# Patient Record
Sex: FEMALE | Race: OTHER | NOT HISPANIC OR LATINO | Employment: OTHER | URBAN - METROPOLITAN AREA
[De-identification: names, ages, dates, MRNs, and addresses within clinical notes are randomized per-mention and may not be internally consistent; named-entity substitution may affect disease eponyms.]

---

## 2022-01-24 ENCOUNTER — TELEPHONE (OUTPATIENT)
Dept: OBSTETRICS AND GYNECOLOGY | Facility: CLINIC | Age: 82
End: 2022-01-24
Payer: MEDICARE

## 2022-01-24 NOTE — TELEPHONE ENCOUNTER
Fax successfully sent to [facility]. LVM stating successful fax and to call their scheduling line ([phone]) to get the appointment set up.       ----- Message from Angela Malagon sent at 1/21/2022  4:05 PM CST -----  Type: Needs Medical Advice  Who Called:  pt  Symptoms (please be specific):    How long has patient had these symptoms:    Pharmacy name and phone #:    Best Call Back Number: [phone] (home)     Additional Information: pt states she is requesting her bone density order to be sent to [facility]. Pt states she received a call last week that it was sent but they never received it. Please call to discuss.

## 2022-03-17 ENCOUNTER — OFFICE VISIT (OUTPATIENT)
Dept: OBSTETRICS AND GYNECOLOGY | Facility: CLINIC | Age: 82
End: 2022-03-17
Payer: MEDICARE

## 2022-03-17 ENCOUNTER — HOSPITAL ENCOUNTER (OUTPATIENT)
Dept: RADIOLOGY | Facility: CLINIC | Age: 82
Discharge: HOME | End: 2022-03-17
Attending: NURSE PRACTITIONER
Payer: MEDICARE

## 2022-03-17 VITALS
BODY MASS INDEX: 32.33 KG/M2 | WEIGHT: 189.38 LBS | DIASTOLIC BLOOD PRESSURE: 79 MMHG | SYSTOLIC BLOOD PRESSURE: 100 MMHG | HEIGHT: 64 IN | HEIGHT: 64 IN | BODY MASS INDEX: 32.27 KG/M2 | WEIGHT: 189 LBS

## 2022-03-17 DIAGNOSIS — Z12.31 ENCOUNTER FOR SCREENING MAMMOGRAM FOR MALIGNANT NEOPLASM OF BREAST: ICD-10-CM

## 2022-03-17 DIAGNOSIS — Z00.01 ANNUAL VISIT FOR GENERAL ADULT MEDICAL EXAMINATION WITH ABNORMAL FINDINGS: Primary | ICD-10-CM

## 2022-03-17 DIAGNOSIS — N90.89 VULVAR LESION: ICD-10-CM

## 2022-03-17 PROCEDURE — G0101 CA SCREEN;PELVIC/BREAST EXAM: HCPCS | Mod: S$GLB,,, | Performed by: OBSTETRICS & GYNECOLOGY

## 2022-03-17 PROCEDURE — 77067 MAMMO DIGITAL SCREENING BILAT WITH TOMO: ICD-10-PCS | Mod: S$GLB,,, | Performed by: RADIOLOGY

## 2022-03-17 PROCEDURE — G0101 PR CA SCREEN;PELVIC/BREAST EXAM: ICD-10-PCS | Mod: S$GLB,,, | Performed by: OBSTETRICS & GYNECOLOGY

## 2022-03-17 PROCEDURE — 77063 MAMMO DIGITAL SCREENING BILAT WITH TOMO: ICD-10-PCS | Mod: S$GLB,,, | Performed by: RADIOLOGY

## 2022-03-17 PROCEDURE — 77063 BREAST TOMOSYNTHESIS BI: CPT | Mod: S$GLB,,, | Performed by: RADIOLOGY

## 2022-03-17 PROCEDURE — 77067 SCR MAMMO BI INCL CAD: CPT | Mod: S$GLB,,, | Performed by: RADIOLOGY

## 2022-03-17 RX ORDER — SILVER SULFADIAZINE 10 G/1000G
CREAM TOPICAL
Qty: 25 G | Refills: 1 | Status: SHIPPED | OUTPATIENT
Start: 2022-03-17 | End: 2023-03-17

## 2022-03-17 RX ORDER — HYDROGEN PEROXIDE 3 %
20 SOLUTION, NON-ORAL MISCELLANEOUS
COMMUNITY

## 2022-03-17 RX ORDER — FUROSEMIDE 20 MG/1
20 TABLET ORAL
COMMUNITY
Start: 2021-09-27

## 2022-03-17 RX ORDER — MONTELUKAST SODIUM 10 MG/1
TABLET ORAL
COMMUNITY
Start: 2021-07-26

## 2022-03-17 RX ORDER — PRAZOSIN HYDROCHLORIDE 1 MG/1
1 CAPSULE ORAL
COMMUNITY
Start: 2022-02-23

## 2022-03-17 RX ORDER — KETOROLAC TROMETHAMINE 4 MG/ML
1 SOLUTION/ DROPS OPHTHALMIC
COMMUNITY

## 2022-03-17 RX ORDER — TOLTERODINE 4 MG/1
CAPSULE, EXTENDED RELEASE ORAL
COMMUNITY
Start: 2021-03-02

## 2022-03-17 RX ORDER — ESZOPICLONE 1 MG/1
1 TABLET, FILM COATED ORAL
COMMUNITY
Start: 2021-12-15

## 2022-03-17 RX ORDER — ROSUVASTATIN CALCIUM 10 MG/1
TABLET, COATED ORAL
COMMUNITY
Start: 2021-07-26

## 2022-03-17 RX ORDER — IBANDRONATE SODIUM 150 MG/1
150 TABLET, FILM COATED ORAL
COMMUNITY

## 2022-03-17 RX ORDER — FAMOTIDINE 20 MG/1
20 TABLET, FILM COATED ORAL
COMMUNITY

## 2022-03-17 RX ORDER — LISINOPRIL 20 MG/1
TABLET ORAL
COMMUNITY
Start: 2021-07-26

## 2022-03-17 RX ORDER — LEVOCETIRIZINE DIHYDROCHLORIDE 5 MG/1
5 TABLET, FILM COATED ORAL
COMMUNITY
Start: 2021-12-27

## 2022-03-17 RX ORDER — ERYTHROMYCIN 5 MG/G
OINTMENT OPHTHALMIC
COMMUNITY

## 2022-03-17 RX ORDER — METOPROLOL SUCCINATE 200 MG/1
100 TABLET, EXTENDED RELEASE ORAL
COMMUNITY

## 2022-03-17 NOTE — PROGRESS NOTES
CC: Well woman exam    Renae Shook is a 81 y.o. female No obstetric history on file. presents for well woman exam.  LMP: No LMP recorded. Patient is postmenopausal..  No issues, problems, or complaints. Patients last pap was  WNL.  Last wellness labs were done a year ago with PCP.  Last mammogram was a year ago. She is a non smoker . Denies any anxiety and depression. She uses a seat belt while riding in the car.  Eating well and exercising.  no sexually active.The current method of family planning is none.  She does report a small lesion on the left side of her gluteal fold towards her vulva.  She also reports significant vaginal itching of that area.  She has a history of BLAINE and is concerned.      Chief Complaint   Patient presents with    Annual Exam     Pt is here for her Annual Well Woman Visit. Pt [id number], LPAP , Last colonoscopy was in , SBE monthly, PCP Dr. Addison, last wellness labs were with PCP a year ago.        Past Medical History:   Diagnosis Date    Diabetes mellitus      Past Surgical History:   Procedure Laterality Date    back epidural      BACK SURGERY      foot        Social History     Socioeconomic History    Marital status:    Tobacco Use    Smoking status: Never Smoker    Smokeless tobacco: Never Used   Substance and Sexual Activity    Alcohol use: Never    Drug use: Never    Sexual activity: Not Currently     Partners: Male     Family History   Problem Relation Age of Onset    Pacemaker/defibrilator Mother     Diabetes Maternal Aunt      OB History             Para        Term   0            AB        Living           SAB        IAB        Ectopic        Multiple        Live Births                     ROS:  Review of Systems   Constitutional: Negative for fatigue, fever and unexpected weight change.   HENT: Negative for nasal congestion.    Respiratory: Negative for cough and shortness of breath.    Cardiovascular: Negative for chest  "pain, palpitations and leg swelling.   Gastrointestinal: Negative for abdominal pain, constipation, diarrhea, nausea, vomiting and reflux.   Endocrine: Negative for diabetes, hair loss and hot flashes.   Genitourinary: Negative for bladder incontinence, decreased libido, dysmenorrhea, dyspareunia, dysuria, hot flashes, menorrhagia, menstrual problem, pelvic pain, vaginal discharge and vaginal dryness.   Musculoskeletal: Negative for arthralgias, back pain and myalgias.   Integumentary:  Negative for rash, acne, hair changes, mole/lesion, breast mass, nipple discharge, breast skin changes and breast tenderness.   Neurological: Negative for seizures, syncope and headaches.   Hematological: Negative for adenopathy. Does not bruise/bleed easily.   Psychiatric/Behavioral: Negative for depression and sleep disturbance. The patient is not nervous/anxious.    Breast: Negative for breast self exam, lump, mass, mastodynia, nipple discharge, skin changes and tenderness       /79   Ht 5' 4" (1.626 m)   Wt 85.7 kg (189 lb)   BMI 32.44 kg/m²     PHYSICAL EXAM:  Physical Exam:   Constitutional: She is oriented to person, place, and time. She appears well-developed and well-nourished. No distress.    HENT:   Head: Normocephalic and atraumatic.   Nose: Nose normal.    Eyes: Pupils are equal, round, and reactive to light. Conjunctivae and EOM are normal.    Neck: No thyromegaly present.    Cardiovascular: Normal rate, regular rhythm and normal heart sounds.  Exam reveals no clubbing, no cyanosis and no edema.     Pulmonary/Chest: Effort normal and breath sounds normal. She has no wheezes. Right breast exhibits no inverted nipple, no mass, no nipple discharge and no bleeding. Left breast exhibits no inverted nipple, no mass, no nipple discharge and no bleeding. Breasts are symmetrical.        Abdominal: Soft. Bowel sounds are normal. She exhibits no distension and no mass. There is no abdominal tenderness. There is no rebound " and no guarding.     Genitourinary:    Inguinal canal and vagina normal.   Labial bartholins normal.There is no rash or lesion on the right labia. There is no rash or lesion on the left labia. There is an absent right adnexa and an absent left adnexa. No  no vaginal discharge, tenderness, rectocele, cystocele or unspecified prolapse of vaginal walls in the vagina. Cervix is absent.Cervix exhibits no motion tenderness and no discharge. Uterus is absent.           Musculoskeletal: Normal range of motion and moves all extremeties. No edema.       Neurological: She is alert and oriented to person, place, and time. She has normal reflexes.    Skin: Skin is warm and dry. No rash noted. No cyanosis. Nails show no clubbing.    Psychiatric: She has a normal mood and affect. Her behavior is normal. Judgment and thought content normal.        Annual visit for general adult medical examination with abnormal findings    Vulvar lesion    Other orders  -     silver sulfADIAZINE 1% (SILVADENE) 1 % cream; Apply to affected area 2 times daily  Dispense: 25 g; Refill: 1  -     hydrocortisone-pramoxine 1.85-1.15 % Crea; Place 1 application rectally 2 (two) times a day.  Dispense: 60 g; Refill: 1      Recommend close observation of vulvar lesion.  Will start Silvadene cream as it appears to be a superficial lesion however close observation to rule out vulvar skin neoplasm is warranted.  Patient expressed understanding.  Return in 1 week for follow-up.  Steroid cream for hemorrhoidal like lesions on rectum.  Patient was counseled today on A.C.S. Pap guidelines and recommendations for yearly pelvic exams, mammograms and monthly self breast exams; to see her PCP for other health maintenance.     No follow-ups on file.

## 2022-03-22 ENCOUNTER — OFFICE VISIT (OUTPATIENT)
Dept: OBSTETRICS AND GYNECOLOGY | Facility: CLINIC | Age: 82
End: 2022-03-22
Payer: MEDICARE

## 2022-03-22 VITALS
HEIGHT: 64 IN | SYSTOLIC BLOOD PRESSURE: 138 MMHG | DIASTOLIC BLOOD PRESSURE: 82 MMHG | BODY MASS INDEX: 32.13 KG/M2 | WEIGHT: 188.19 LBS

## 2022-03-22 DIAGNOSIS — N90.89 VULVAR LESION: Primary | ICD-10-CM

## 2022-03-22 PROCEDURE — 99213 PR OFFICE/OUTPT VISIT, EST, LEVL III, 20-29 MIN: ICD-10-PCS | Mod: S$GLB,,, | Performed by: OBSTETRICS & GYNECOLOGY

## 2022-03-22 PROCEDURE — 99213 OFFICE O/P EST LOW 20 MIN: CPT | Mod: S$GLB,,, | Performed by: OBSTETRICS & GYNECOLOGY

## 2022-03-22 RX ORDER — LANCETS 33 GAUGE
EACH MISCELLANEOUS
COMMUNITY
Start: 2022-02-06

## 2022-03-22 RX ORDER — FERROUS SULFATE, DRIED 160(50) MG
1 TABLET, EXTENDED RELEASE ORAL
COMMUNITY

## 2022-03-22 RX ORDER — CELECOXIB 200 MG/1
200 CAPSULE ORAL
COMMUNITY
Start: 2021-10-14

## 2022-03-22 RX ORDER — CHOLECALCIFEROL (VITAMIN D3) 25 MCG
2000 TABLET ORAL
COMMUNITY

## 2022-03-22 RX ORDER — BUSPIRONE HYDROCHLORIDE 5 MG/1
TABLET ORAL
COMMUNITY
Start: 2021-01-28

## 2022-03-22 NOTE — PROGRESS NOTES
Patient ID: Renae Shook is a 81 y.o. female.    Chief Complaint:  Follow-up (Pt is here for a follow up visit )      History of Present Illness  HPI  Patient here for follow-up from vulvar lesion.  Patient with ulcerated 8 mm lesion on the left gluteal cleft near the vulva.  No induration.  Patient states that had been there for roughly 3 weeks.  Is tender when she sits down.  She was started on Silvadene cream twice a day and states it does feel better.  She did recently noticed that 1 of her portable Chairs has a plastics eat with a crack in it that is in the area of the lesion that may be that it got pinched when she was sitting down.  Patient has a history of endometrial cancer and is in remission.    GYN & OB History  No LMP recorded. Patient is postmenopausal.   Date of Last Pap: No result found    Past Medical History:   Diagnosis Date    Diabetes mellitus      Past Surgical History:   Procedure Laterality Date    back epidural      BACK SURGERY      foot        Review of patient's allergies indicates:   Allergen Reactions    Ondansetron Nausea And Vomiting     CAUSES VOMITING      Sulfamethoxazole-trimethoprim Itching and Rash    Aspirin     Penicillins     Sulfa (sulfonamide antibiotics)      Other reaction(s): Unknown    Tetanus and diphtheria toxoids     Tetanus immune globulin     Tetanus toxoid      Current Outpatient Medications on File Prior to Visit   Medication Sig Dispense Refill    blood sugar diagnostic (CONTOUR NEXT TEST STRIPS MISC)   Contour Next test strips, See Instructions, Use to check blood glucose once daily. Dx code E11.9, # 100 EA, 3 Refill(s), Pharmacy: [id number], 163.7, cm, 01/25/22 9:08:00 CST, Height/Length Measured, 83.2, kg, 01/25/22 9:08:00 CS...      busPIRone (BUSPAR) 5 MG Tab   See Instructions, TAKE 1 TABLET AT BEDTIME AND EVERY EIGHT HOURS DURING THE DAY IF NEEDED., # 90 tab, 3 Refill(s), Pharmacy: [facility], 163.7, cm,  12/27/21 10:31:00 CST, Height/Length Measured, 86.5, kg, 12/27/21 10:31:00 CST, Weight Dosing      calcium-vitamin D3 (OS-PADMAJA 500 + D3) 500 mg-5 mcg (200 unit) per tablet Take 1 tablet by mouth.      celecoxib (CELEBREX) 200 MG capsule 200 mg.      docosahexaenoic acid-epa 120-180 mg Cap Take 1 capsule by mouth once daily.      erythromycin (ROMYCIN) ophthalmic ointment Apply to eye.      esomeprazole (NEXIUM) 20 MG capsule Take 20 mg by mouth.      eszopiclone (LUNESTA) 1 MG Tab 1 mg.      famotidine (PEPCID) 20 MG tablet Take 20 mg by mouth.      furosemide (LASIX) 20 MG tablet 20 mg.      histamine phosphate 0.1 (0.275) mg/mL Soln Histamine #3 0.1-0.5ml sub cut weekly      hydrocortisone-pramoxine 1.85-1.15 % Crea Place 1 application rectally 2 (two) times a day. 60 g 1    ibandronate (BONIVA) 150 mg tablet Take 150 mg by mouth.      ketorolac 0.4% (ACULAR) 0.4 % Drop 1 drop.      lancets 33 gauge Misc   33 g Lancets, See Instructions, Test once daily E11.9, # 100 EA, 3 Refill(s), Pharmacy: [facility], 163.7, cm, 01/25/22 9:08:00 CST, Height/Length Measured, 83.2, kg, 01/25/22 9:08:00 CST, Weight Dosing      levocetirizine (XYZAL) 5 MG tablet 5 mg.      lisinopriL (PRINIVIL,ZESTRIL) 20 MG tablet   = 1 tab, Oral, BID, # 180 tab, 3 Refill(s), Maintenance, Pharmacy: [facility]165, 163, cm, 07/26/21 9:25:00 CDT, Height/Length Measured, 86.8, kg, 07/26/21 9:25:00 CDT, Weight Dosing      metoprolol succinate (TOPROL-XL) 200 MG 24 hr tablet Take 100 mg by mouth.      montelukast (SINGULAIR) 10 mg tablet   See Instructions, TAKE 1 TABLET EACH EVENING., # 90 tab, 3 Refill(s), Soft Stop, Pharmacy: [facility], 163, cm, 07/26/21 9:25:00 CDT, Height/Length Measured, 86.8, kg, 07/26/21 9:25:00 CDT, Weight Dosing      prazosin (MINIPRESS) 1 MG Cap 1 mg.      rosuvastatin (CRESTOR) 10 MG tablet   = 1 tab, Oral, Daily, # 90 tab, 3 Refill(s), Soft Stop, Pharmacy:  [facility], 163, cm, 21 9:25:00 CDT, Height/Length Measured, 86.8, kg, 21 9:25:00 CDT, Weight Dosing      silver sulfADIAZINE 1% (SILVADENE) 1 % cream Apply to affected area 2 times daily 25 g 1    tolterodine (DETROL LA) 4 MG 24 hr capsule   = 1 cap, Oral, Daily, # 90 cap, 3 Refill(s), Maintenance, Pharmacy: [facility], 163, cm, 02/10/21 14:27:00 CST, Height/Length Measured, 82.2, kg, 02/10/21 14:27:00 CST, Weight Dosing      vitamin D (VITAMIN D3) 1000 units Tab Take 2,000 Units by mouth.       No current facility-administered medications on file prior to visit.       OB History    Para Term  AB Living       0         SAB IAB Ectopic Multiple Live Births                   Review of Systems  Review of Systems   Constitutional: Negative for fatigue, fever and unexpected weight change.   HENT: Negative for nasal congestion.    Respiratory: Negative for cough and shortness of breath.    Cardiovascular: Negative for chest pain, palpitations and leg swelling.   Gastrointestinal: Negative for abdominal pain, constipation, diarrhea, nausea, vomiting and reflux.   Endocrine: Negative for diabetes, hair loss and hot flashes.   Genitourinary: Negative for bladder incontinence, decreased libido, dysmenorrhea, dyspareunia, dysuria, hot flashes, menorrhagia, menstrual problem, pelvic pain, vaginal discharge and vaginal dryness.   Musculoskeletal: Negative for arthralgias, back pain and myalgias.   Integumentary:  Negative for rash, acne, hair changes, mole/lesion, breast mass, nipple discharge, breast skin changes and breast tenderness.   Neurological: Negative for seizures, syncope and headaches.   Hematological: Negative for adenopathy. Does not bruise/bleed easily.   Psychiatric/Behavioral: Negative for depression and sleep disturbance. The patient is not nervous/anxious.    Breast: Negative for breast self exam, lump, mass, mastodynia, nipple discharge, skin changes and tenderness         "      Objective:   /82   Ht 5' 4" (1.626 m)   Wt 85.4 kg (188 lb 3.2 oz)   BMI 32.30 kg/m²        Physical Exam:   Constitutional: She is oriented to person, place, and time. She appears well-developed and well-nourished. No distress.    HENT:   Head: Normocephalic and atraumatic.   Nose: Nose normal.            Abdominal: Soft. She exhibits no distension and no mass. There is no abdominal tenderness.     Genitourinary:          Genitourinary Comments: 8 mm ulcerated lesions on left gluteal cleft noted.  Appears to be slightly smaller than previous             Musculoskeletal: Normal range of motion and moves all extremeties. No edema.       Neurological: She is alert and oriented to person, place, and time. No cranial nerve deficit.     Psychiatric: She has a normal mood and affect. Her behavior is normal. Judgment and thought content normal.            Assessment:      1. Vulvar lesion          Plan:       Continue Silvadene cream.  Slight improvement noted.  Return in 2 weeks for follow-up.  If no improvement recommend excisional biopsy in procedure room.  Patient expressed understanding.       "

## 2022-04-04 ENCOUNTER — TELEPHONE (OUTPATIENT)
Dept: OBSTETRICS AND GYNECOLOGY | Facility: CLINIC | Age: 82
End: 2022-04-04
Payer: MEDICARE

## 2022-04-04 NOTE — TELEPHONE ENCOUNTER
Pt rescheduled for 4/13/22 @10:30 a.m CR location, Pt advised to continue using cream per  request    ----- Message from Angela Malagon sent at 4/4/2022 12:00 PM CDT -----  Type: Needs Medical Advice  Who Called:  pt  Symptoms (please be specific):    How long has patient had these symptoms:    Pharmacy name and phone #:    Best Call Back Number: [phone] (home) or [phone]     Additional Information: pt is needing to reschedule her appt with Dr. Carvajal that was for 4/5. Pt is currently in the hospital right now and thinks she will not get out until tomorrow. Please call to discuss.

## 2022-04-13 ENCOUNTER — OFFICE VISIT (OUTPATIENT)
Dept: OBSTETRICS AND GYNECOLOGY | Facility: CLINIC | Age: 82
End: 2022-04-13
Payer: MEDICARE

## 2022-04-13 VITALS
WEIGHT: 186.19 LBS | HEIGHT: 64 IN | SYSTOLIC BLOOD PRESSURE: 142 MMHG | BODY MASS INDEX: 31.79 KG/M2 | DIASTOLIC BLOOD PRESSURE: 86 MMHG

## 2022-04-13 DIAGNOSIS — N90.89 VULVAR LESION: Primary | ICD-10-CM

## 2022-04-13 PROCEDURE — 99213 PR OFFICE/OUTPT VISIT, EST, LEVL III, 20-29 MIN: ICD-10-PCS | Mod: S$GLB,,, | Performed by: OBSTETRICS & GYNECOLOGY

## 2022-04-13 PROCEDURE — 99213 OFFICE O/P EST LOW 20 MIN: CPT | Mod: S$GLB,,, | Performed by: OBSTETRICS & GYNECOLOGY

## 2022-04-13 RX ORDER — POTASSIUM CHLORIDE 750 MG/1
10 CAPSULE, EXTENDED RELEASE ORAL
COMMUNITY
Start: 2021-09-27

## 2022-04-13 RX ORDER — LISINOPRIL 20 MG/1
TABLET ORAL
COMMUNITY
Start: 2021-07-26

## 2022-04-13 RX ORDER — FLUTICASONE PROPIONATE 50 MCG
50 SPRAY, SUSPENSION (ML) NASAL
COMMUNITY
Start: 2022-04-01

## 2022-04-13 RX ORDER — SOLIFENACIN SUCCINATE 5 MG/1
5 TABLET, FILM COATED ORAL
COMMUNITY
Start: 2022-01-26

## 2022-04-13 RX ORDER — LINEZOLID 600 MG/1
600 TABLET, FILM COATED ORAL
COMMUNITY
Start: 2022-04-04 | End: 2022-04-14

## 2022-04-13 RX ORDER — DICLOFENAC SODIUM 10 MG/G
GEL TOPICAL
COMMUNITY
Start: 2022-04-05

## 2022-04-13 RX ORDER — PNV NO.95/FERROUS FUM/FOLIC AC 28MG-0.8MG
500 TABLET ORAL
COMMUNITY

## 2022-04-14 NOTE — PROGRESS NOTES
Patient ID: Renae Shook is a 81 y.o. female.    Chief Complaint:  Follow-up (Pt is here for a follow up for a vulvar lesion.)      History of Present Illness  HPI  Patient here for follow-up of vulvar lesion.  She states she has continued put Silvadene cream on it and she states it feels like it is much smaller.  She still has some pain and discomfort when she sits a certain way.  No bleeding.  Of note she was recently admitted for cellulitis of her right lower extremity and is currently on linezolid for antibiotic treatment.  Cardiac workup was negative in the hospital.  She has no other complaints.    GYN & OB History  No LMP recorded. Patient is postmenopausal.   Date of Last Pap: No result found    Past Medical History:   Diagnosis Date    Diabetes mellitus      Past Surgical History:   Procedure Laterality Date    back epidural      BACK SURGERY      foot        Review of patient's allergies indicates:   Allergen Reactions    Ondansetron Nausea And Vomiting     CAUSES VOMITING    Other reaction(s): Nausea and vomiting  CAUSES VOMITING    Sulfamethoxazole-trimethoprim Itching and Rash    Aspirin      Other reaction(s): Unknown  Patient had skin allergy testing and states she reacted to the aspirin so was told not to take it.    Penicillin      Other reaction(s): Welts  When she was 9 years old she got a PCN shot at the doctors office, she developed welts shortly after that.    Penicillins     Sulfa (sulfonamide antibiotics)      Other reaction(s): Unknown  Other reaction(s): Rash    Tetanus and diphtheria toxoids     Tetanus immune globulin      Other reaction(s): unknown  She can't remember reaction but it was in college.  She was told it was okay for her to get the tetanus toxoid, but not the immune globulin.    Tetanus toxoid      Current Outpatient Medications on File Prior to Visit   Medication Sig Dispense Refill    blood sugar diagnostic (CONTOUR NEXT TEST STRIPS MISC)   Contour Next  test strips, See Instructions, Use to check blood glucose once daily. Dx code E11.9, # 100 EA, 3 Refill(s), Pharmacy: [facility]165, 163.7, cm, 01/25/22 9:08:00 CST, Height/Length Measured, 83.2, kg, 01/25/22 9:08:00 CS...      busPIRone (BUSPAR) 5 MG Tab   See Instructions, TAKE 1 TABLET AT BEDTIME AND EVERY EIGHT HOURS DURING THE DAY IF NEEDED., # 90 tab, 3 Refill(s), Pharmacy: [facility], 163.7, cm, 12/27/21 10:31:00 CST, Height/Length Measured, 86.5, kg, 12/27/21 10:31:00 CST, Weight Dosing      calcium-vitamin D3 (OS-PADMAJA 500 + D3) 500 mg-5 mcg (200 unit) per tablet Take 1 tablet by mouth.      celecoxib (CELEBREX) 200 MG capsule 200 mg.      cyanocobalamin (VITAMIN B-12) 100 MCG tablet Take 500 mcg by mouth.      diclofenac sodium (VOLTAREN) 1 % Gel   1 rachel, Topical, QID, PRN for pain, # 100 gm, 0 Refill(s)      docosahexaenoic acid-epa 120-180 mg Cap Take 1 capsule by mouth once daily.      erythromycin (ROMYCIN) ophthalmic ointment Apply to eye.      esomeprazole (NEXIUM) 20 MG capsule Take 20 mg by mouth.      eszopiclone (LUNESTA) 1 MG Tab 1 mg.      famotidine (PEPCID) 20 MG tablet Take 20 mg by mouth.      fluticasone propionate (FLONASE) 50 mcg/actuation nasal spray 50 mcg.      furosemide (LASIX) 20 MG tablet 20 mg.      histamine phosphate 0.1 (0.275) mg/mL Soln Histamine #3 0.1-0.5ml sub cut weekly      hydrocortisone-pramoxine 1.85-1.15 % Crea Place 1 application rectally 2 (two) times a day. 60 g 1    ibandronate (BONIVA) 150 mg tablet Take 150 mg by mouth.      ketorolac 0.4% (ACULAR) 0.4 % Drop 1 drop.      lancets 33 gauge Misc   33 g Lancets, See Instructions, Test once daily E11.9, # 100 EA, 3 Refill(s), Pharmacy: [facility]165, 163.7, cm, 01/25/22 9:08:00 CST, Height/Length Measured, 83.2, kg, 01/25/22 9:08:00 CST, Weight Dosing      levocetirizine (XYZAL) 5 MG tablet 5 mg.      linezolid (ZYVOX) 600 mg Tab 600 mg.      lisinopriL  (PRINIVIL,ZESTRIL) 20 MG tablet   = 1 tab, Oral, BID, # 180 tab, 3 Refill(s), Maintenance, Pharmacy: [facility]165, 163, cm, 21 9:25:00 CDT, Height/Length Measured, 86.8, kg, 21 9:25:00 CDT, Weight Dosing      lisinopriL (PRINIVIL,ZESTRIL) 20 MG tablet   = 1 tab, Oral, Daily, # 180 tab, 3 Refill(s), Maintenance, Pharmacy: [facility] 6165, 163, cm, 21 9:25:00 CDT, Height/Length Measured, 86.8, kg, 21 9:25:00 CDT, Weight Dosing      metoprolol succinate (TOPROL-XL) 200 MG 24 hr tablet Take 100 mg by mouth.      montelukast (SINGULAIR) 10 mg tablet   See Instructions, TAKE 1 TABLET EACH EVENING., # 90 tab, 3 Refill(s), Soft Stop, Pharmacy: [facility], 163, cm, 21 9:25:00 CDT, Height/Length Measured, 86.8, kg, 21 9:25:00 CDT, Weight Dosing      potassium chloride (MICRO-K) 10 MEQ CpSR 10 mEq.      prazosin (MINIPRESS) 1 MG Cap 1 mg.      rosuvastatin (CRESTOR) 10 MG tablet   = 1 tab, Oral, Daily, # 90 tab, 3 Refill(s), Soft Stop, Pharmacy: [facility], 163, cm, 21 9:25:00 CDT, Height/Length Measured, 86.8, kg, 21 9:25:00 CDT, Weight Dosing      silver sulfADIAZINE 1% (SILVADENE) 1 % cream Apply to affected area 2 times daily 25 g 1    solifenacin (VESICARE) 5 MG tablet 5 mg.      tolterodine (DETROL LA) 4 MG 24 hr capsule   = 1 cap, Oral, Daily, # 90 cap, 3 Refill(s), Maintenance, Pharmacy: [facility], 163, cm, 02/10/21 14:27:00 CST, Height/Length Measured, 82.2, kg, 02/10/21 14:27:00 CST, Weight Dosing      vitamin D (VITAMIN D3) 1000 units Tab Take 2,000 Units by mouth.       No current facility-administered medications on file prior to visit.       OB History    Para Term  AB Living       0         SAB IAB Ectopic Multiple Live Births                   Review of Systems  Review of Systems   Constitutional: Negative for fatigue, fever and unexpected weight change.   HENT: Negative for nasal  "congestion.    Respiratory: Negative for cough and shortness of breath.    Cardiovascular: Negative for chest pain, palpitations and leg swelling.   Gastrointestinal: Negative for abdominal pain, constipation, diarrhea, nausea, vomiting and reflux.   Endocrine: Negative for diabetes, hair loss and hot flashes.   Genitourinary: Negative for bladder incontinence, decreased libido, dysmenorrhea, dyspareunia, dysuria, hot flashes, menorrhagia, menstrual problem, pelvic pain, vaginal discharge and vaginal dryness.   Musculoskeletal: Negative for arthralgias, back pain and myalgias.   Integumentary:  Negative for rash, acne, hair changes, mole/lesion, breast mass, nipple discharge, breast skin changes and breast tenderness.   Neurological: Negative for seizures, syncope and headaches.   Hematological: Negative for adenopathy. Does not bruise/bleed easily.   Psychiatric/Behavioral: Negative for depression and sleep disturbance. The patient is not nervous/anxious.    Breast: Negative for breast self exam, lump, mass, mastodynia, nipple discharge, skin changes and tenderness              Objective:   BP (!) 142/86   Ht 5' 4" (1.626 m)   Wt 84.5 kg (186 lb 3.2 oz)   BMI 31.96 kg/m²        Physical Exam:   Constitutional: She is oriented to person, place, and time. She appears well-developed and well-nourished. No distress.    HENT:   Head: Normocephalic and atraumatic.   Nose: Nose normal.            Abdominal: Soft. She exhibits no distension and no mass. There is no abdominal tenderness.     Genitourinary:          Genitourinary Comments: Significant interval decrease in size of left vulvar lesion.  Ulceration is closed and no induration.  Ulceration is essentially closed up.  Small scab on the top of the lesion is still noted.             Musculoskeletal: Normal range of motion and moves all extremeties. No edema.       Neurological: She is alert and oriented to person, place, and time. No cranial nerve deficit.   "   Psychiatric: She has a normal mood and affect. Her behavior is normal. Judgment and thought content normal.            Assessment:      1. Vulvar lesion          Plan:      Vulvar lesion healing appropriately.  Highly unlikely that this lesion is precancerous or cancerous in nature.  This was discussed with the patient.  Expect lesion to heal up in 1 more week.  Return if anything else comes up or changes.

## 2023-01-27 ENCOUNTER — TELEPHONE (OUTPATIENT)
Dept: OBSTETRICS AND GYNECOLOGY | Facility: CLINIC | Age: 83
End: 2023-01-27
Payer: MEDICARE

## 2023-01-27 DIAGNOSIS — M81.0 AGE-RELATED OSTEOPOROSIS WITHOUT CURRENT PATHOLOGICAL FRACTURE: Primary | ICD-10-CM

## 2023-01-31 ENCOUNTER — TELEPHONE (OUTPATIENT)
Dept: OBSTETRICS AND GYNECOLOGY | Facility: CLINIC | Age: 83
End: 2023-01-31
Payer: MEDICARE

## 2023-01-31 NOTE — TELEPHONE ENCOUNTER
Pt notified order was received by [facility]. Pt stated she scheduled for 2/8/23          ----- Message from Mounika Howell MA sent at 1/25/2023  4:55 PM CST -----  Contact: pt    ----- Message -----  From: Amelia Staples  Sent: 1/24/2023  10:28 AM CST  To: Alena Santoyo (Obgyn) Staff    Type: Needs Medical Advice         Who Called:PT  Best Call Back Number:[phone]  Additional Information: Requesting a call back regarding  Pt has orders and needs orders attached. Pt also asking about having a bone density test that day. Please call pt about that  Please Advise- Thank you

## 2023-01-31 NOTE — TELEPHONE ENCOUNTER
----- Message from Amelia Staples sent at 1/30/2023  7:58 AM CST -----  Contact: pt  Thank you  ----- Message -----  From: Mounika Howell MA  Sent: 1/27/2023   4:21 PM CST  To: Amelia Staples    Ill request an order for Bone density scan. Clinic no longer does them. Patients are referred out. Thanks    ----- Message -----  From: Mounika Howell MA  Sent: 1/25/2023   4:55 PM CST  To: Mounika Howell MA      ----- Message -----  From: Amelia Staples  Sent: 1/24/2023  10:28 AM CST  To: Alena Santoyo ([facility]) Staff    Type: Needs Medical Advice         Who Called:PT  Best Call Back Number:[phone]  Additional Information: Requesting a call back regarding  Pt has orders and needs orders attached. Pt also asking about having a bone density test that day. Please call pt about that  Please Advise- Thank you

## 2023-02-03 DIAGNOSIS — Z12.31 ENCOUNTER FOR SCREENING MAMMOGRAM FOR MALIGNANT NEOPLASM OF BREAST: Primary | ICD-10-CM

## 2023-02-08 ENCOUNTER — TELEPHONE (OUTPATIENT)
Dept: OBSTETRICS AND GYNECOLOGY | Facility: CLINIC | Age: 83
End: 2023-02-08
Payer: MEDICARE

## 2023-02-08 NOTE — TELEPHONE ENCOUNTER
----- Message from Pari Márquez sent at 2/8/2023 11:38 AM CST -----  Contact: pt  Orders    Pt needing orders sent to the following for a mammo: Orders for Denti ty was sent but not mammo    [facility] in [place]      Pt [phone]

## 2023-02-08 NOTE — TELEPHONE ENCOUNTER
Called Pt,NA. LVM stating a Mammogram has been refaxed.    ---------------------------------------------------------          ----- Message from Pari Márquez sent at 2/8/2023 11:38 AM CST -----  Contact: pt  Orders    Pt needing orders sent to the following for a mammo: Orders for Denti ty was sent but not mammo    Tasha Lopez in [place]      Pt [phone]

## 2023-02-23 ENCOUNTER — TELEPHONE (OUTPATIENT)
Dept: OBSTETRICS AND GYNECOLOGY | Facility: CLINIC | Age: 83
End: 2023-02-23

## 2023-02-23 NOTE — TELEPHONE ENCOUNTER
Pt notified Mammo order was resent to [facility]    ----------------------------------------------------      ----- Message from Ayesha Morris MA sent at 2/17/2023  9:41 AM CST -----  Contact: pt    ----- Message -----  From: Pari Márquez  Sent: 2/17/2023   9:38 AM CST  To: [facility] Staff    Orders    Pt has been waiting for days for orders for mammo and bone density, can you assist today since he is off and send it.   Please call pt to let her know if it was sent or forward to Alena for Monday I guess.     Send [facility]     Pt phone to call: [phone]

## 2023-03-30 ENCOUNTER — OFFICE VISIT (OUTPATIENT)
Dept: OBSTETRICS AND GYNECOLOGY | Facility: CLINIC | Age: 83
End: 2023-03-30
Payer: MEDICARE

## 2023-03-30 VITALS
WEIGHT: 178.19 LBS | SYSTOLIC BLOOD PRESSURE: 126 MMHG | HEIGHT: 64 IN | BODY MASS INDEX: 30.42 KG/M2 | DIASTOLIC BLOOD PRESSURE: 72 MMHG

## 2023-03-30 DIAGNOSIS — Z00.00 ANNUAL VISIT FOR GENERAL ADULT MEDICAL EXAMINATION WITHOUT ABNORMAL FINDINGS: Primary | ICD-10-CM

## 2023-03-30 DIAGNOSIS — M81.0 AGE-RELATED OSTEOPOROSIS WITHOUT CURRENT PATHOLOGICAL FRACTURE: ICD-10-CM

## 2023-03-30 PROCEDURE — G0101 CA SCREEN;PELVIC/BREAST EXAM: HCPCS | Mod: GZ,S$GLB,, | Performed by: OBSTETRICS & GYNECOLOGY

## 2023-03-30 PROCEDURE — G0101 PR CA SCREEN;PELVIC/BREAST EXAM: ICD-10-PCS | Mod: GZ,S$GLB,, | Performed by: OBSTETRICS & GYNECOLOGY

## 2023-04-04 NOTE — PROGRESS NOTES
CC: Well woman exam    Renae Shook is a 82 y.o. female No obstetric history on file. presents for well woman exam.  LMP: No LMP recorded. Patient is postmenopausal..  No issues, problems, or complaints. Patients last pap was 10 years ago.  Last wellness labs were done 2 months.  Last mammogram was 1 year. She is a non smoker . Denies any anxiety and depression. She uses a seat belt while riding in the car.  Eating well and exercising.  no sexually active.The current method of family planning is none and post menopausal.  She is not currently taking her Boniva and stopped for an unknown reason.  She did recently have a DEXA scan which will be reviewed with the patient.      Chief Complaint   Patient presents with    Follow-up     Pt is here for a follow up for bone density scan         Past Medical History:   Diagnosis Date    Diabetes mellitus      Past Surgical History:   Procedure Laterality Date    back epidural      BACK SURGERY      foot        Social History     Socioeconomic History    Marital status:    Tobacco Use    Smoking status: Never    Smokeless tobacco: Never   Substance and Sexual Activity    Alcohol use: Never    Drug use: Never    Sexual activity: Not Currently     Partners: Male     Family History   Problem Relation Age of Onset    Pacemaker/defibrilator Mother     Diabetes Maternal Aunt      OB History               Para        Term   0            AB        Living             SAB        IAB        Ectopic        Multiple        Live Births                     ROS:  Review of Systems   Constitutional:  Negative for fatigue, fever and unexpected weight change.   HENT:  Negative for nasal congestion.    Respiratory:  Negative for cough and shortness of breath.    Cardiovascular:  Negative for chest pain, palpitations and leg swelling.   Gastrointestinal:  Negative for abdominal pain, constipation, diarrhea, nausea, vomiting and reflux.   Endocrine: Negative for diabetes,  "hair loss and hot flashes.   Genitourinary:  Negative for bladder incontinence, decreased libido, dysmenorrhea, dyspareunia, dysuria, hot flashes, menorrhagia, menstrual problem, pelvic pain, vaginal discharge and vaginal dryness.   Musculoskeletal:  Negative for arthralgias, back pain and myalgias.   Integumentary:  Negative for rash, acne, hair changes, mole/lesion, breast mass, nipple discharge, breast skin changes and breast tenderness.   Neurological:  Negative for seizures, syncope and headaches.   Hematological:  Negative for adenopathy. Does not bruise/bleed easily.   Psychiatric/Behavioral:  Negative for depression and sleep disturbance. The patient is not nervous/anxious.    Breast: Negative for breast self exam, lump, mass, mastodynia, nipple discharge, skin changes and tenderness     /72   Ht 5' 4" (1.626 m)   Wt 80.8 kg (178 lb 3.2 oz)   BMI 30.59 kg/m²     PHYSICAL EXAM:  Physical Exam:   Constitutional: She is oriented to person, place, and time. She appears well-developed and well-nourished. No distress.    HENT:   Head: Normocephalic and atraumatic.   Nose: Nose normal.            Abdominal: Soft. She exhibits no distension and no mass. There is no abdominal tenderness.             Musculoskeletal: Normal range of motion and moves all extremeties. No edema.       Neurological: She is alert and oriented to person, place, and time. No cranial nerve deficit.     Psychiatric: She has a normal mood and affect. Her behavior is normal. Judgment and thought content normal.      Annual visit for general adult medical examination without abnormal findings    Age-related osteoporosis without current pathological fracture  -     Ambulatory referral/consult to Endocrinology; Future; Expected date: 04/11/2023      DEXA scan reviewed with patient.  Increased risk of fracture.  Recommend referral to endocrinology for medical management.  Patient was on Boniva however stopped for an unknown reason.  She " likely needs more medical management.  Return in 1 year.  Order for mammogram given.  Patient was counseled today on A.C.S. Pap guidelines and recommendations for yearly pelvic exams, mammograms and monthly self breast exams; to see her PCP for other health maintenance.

## 2023-04-13 ENCOUNTER — TELEPHONE (OUTPATIENT)
Dept: OBSTETRICS AND GYNECOLOGY | Facility: CLINIC | Age: 83
End: 2023-04-13
Payer: MEDICARE

## 2023-04-13 NOTE — TELEPHONE ENCOUNTER
Pt notified referral was refaxed to Endo Dr. Burciaga office. Pt advised understanding.    ===============================================================        ----- Message from Amelia Staples sent at 4/13/2023  1:48 PM CDT -----  Type: Needs Medical Advice         Who Called: pt  Best Call Back Number: [phone]   Additional Information: Requesting a call back regarding  pt is asking of office resent the Endocrinology referral to Dr Burciaga . Pt said Dr Burciaga office is stating they never received it. Pt asking for office to send it again please. Pt is asking for office to call her to confirm it was faxed.       [facility]  [address] [place] [address]  P: [phone]  F: [phone]      Please Advise- Thank you

## 2023-04-14 ENCOUNTER — TELEPHONE (OUTPATIENT)
Dept: OBSTETRICS AND GYNECOLOGY | Facility: CLINIC | Age: 83
End: 2023-04-14
Payer: MEDICARE

## 2023-04-14 NOTE — TELEPHONE ENCOUNTER
Referral was refaxed to ATT: April 228- [phone]    ======================================================================  ----- Message from Dora Cruz sent at 4/14/2023  9:06 AM CDT -----  Contact: [phone]  Type: Needs Medical Advice  Who Called:  Pt     Best Call Back Number: [phone]    Additional Information: Pt requesting a call back from Mounika because her referral that was faxed yesterday to Dr Burciaga was never received. Pls resend fax to:   [phone] ATT: April

## 2023-04-14 NOTE — TELEPHONE ENCOUNTER
----- Message from Dora Cruz sent at 4/14/2023  9:06 AM CDT -----  Contact: [phone]  Type: Needs Medical Advice  Who Called:  Pt     Best Call Back Number: [phone]    Additional Information: Pt requesting a call back from Mounika because her referral that was faxed yesterday to Dr Burciaga was never received. Pls resend fax to:   [phone] ATT: April

## 2023-06-05 ENCOUNTER — TELEPHONE (OUTPATIENT)
Dept: OBSTETRICS AND GYNECOLOGY | Facility: CLINIC | Age: 83
End: 2023-06-05
Payer: MEDICARE

## 2023-06-05 NOTE — TELEPHONE ENCOUNTER
----- Message from Pari Márquez sent at 6/5/2023 12:53 PM CDT -----  Contact: PT  Type:  RX Refill Request    Who Called:  pt  Refill or New Rx:  refil-- GAVE THIS BEFORE TO HER    RX Name and Strength:  NYAMYC 60GRAMS- NYSTATAN TOPICAL POWERED    How is the patient currently taking it? (ex. 1XDay):  AS ORDER  Is this a 30 day or 90 day RX:  3 REFILL    Preferred Pharmacy with phone number:      [facility] [place], [place] - [address]   [address] [facility] [address]  Phone: [phone] Fax: [phone]      Local or Mail Order:  LOCAL  Ordering Provider:  MEENA Sullivan Call Back Number:  [phone]  Additional Information:  SAID YOU GAVE THIS TO HER BEFORE.